# Patient Record
Sex: FEMALE | Race: WHITE
[De-identification: names, ages, dates, MRNs, and addresses within clinical notes are randomized per-mention and may not be internally consistent; named-entity substitution may affect disease eponyms.]

---

## 2018-01-11 ENCOUNTER — HOSPITAL ENCOUNTER (EMERGENCY)
Dept: HOSPITAL 17 - PHED | Age: 53
Discharge: HOME | End: 2018-01-11
Payer: COMMERCIAL

## 2018-01-11 VITALS — HEIGHT: 62 IN | BODY MASS INDEX: 25.15 KG/M2 | WEIGHT: 136.69 LBS

## 2018-01-11 VITALS
HEART RATE: 79 BPM | OXYGEN SATURATION: 98 % | TEMPERATURE: 98.4 F | RESPIRATION RATE: 16 BRPM | SYSTOLIC BLOOD PRESSURE: 128 MMHG | DIASTOLIC BLOOD PRESSURE: 68 MMHG

## 2018-01-11 VITALS — SYSTOLIC BLOOD PRESSURE: 145 MMHG | TEMPERATURE: 98.2 F | DIASTOLIC BLOOD PRESSURE: 55 MMHG

## 2018-01-11 VITALS — OXYGEN SATURATION: 97 % | RESPIRATION RATE: 16 BRPM

## 2018-01-11 DIAGNOSIS — R10.9: Primary | ICD-10-CM

## 2018-01-11 DIAGNOSIS — M54.5: ICD-10-CM

## 2018-01-11 LAB
ALBUMIN SERPL-MCNC: 4 GM/DL (ref 3.4–5)
ALP SERPL-CCNC: 49 U/L (ref 45–117)
ALT SERPL-CCNC: 21 U/L (ref 10–53)
AST SERPL-CCNC: 20 U/L (ref 15–37)
BASOPHILS # BLD AUTO: 0.1 TH/MM3 (ref 0–0.2)
BASOPHILS NFR BLD: 1.7 % (ref 0–2)
BILIRUB SERPL-MCNC: 0.3 MG/DL (ref 0.2–1)
BUN SERPL-MCNC: 11 MG/DL (ref 7–18)
CALCIUM SERPL-MCNC: 9.1 MG/DL (ref 8.5–10.1)
CHLORIDE SERPL-SCNC: 101 MEQ/L (ref 98–107)
COLOR UR: YELLOW
CREAT SERPL-MCNC: 0.75 MG/DL (ref 0.5–1)
EOSINOPHIL # BLD: 0.1 TH/MM3 (ref 0–0.4)
EOSINOPHIL NFR BLD: 2 % (ref 0–4)
ERYTHROCYTE [DISTWIDTH] IN BLOOD BY AUTOMATED COUNT: 11.8 % (ref 11.6–17.2)
GFR SERPLBLD BASED ON 1.73 SQ M-ARVRAT: 81 ML/MIN (ref 89–?)
GLUCOSE SERPL-MCNC: 83 MG/DL (ref 74–106)
GLUCOSE UR STRIP-MCNC: (no result) MG/DL
HCO3 BLD-SCNC: 27.2 MEQ/L (ref 21–32)
HCT VFR BLD CALC: 38.5 % (ref 35–46)
HGB BLD-MCNC: 12.6 GM/DL (ref 11.6–15.3)
HGB UR QL STRIP: (no result)
INR PPP: 1.1 RATIO
KETONES UR STRIP-MCNC: (no result) MG/DL
LIPASE: 164 U/L (ref 73–393)
LYMPHOCYTES # BLD AUTO: 1.8 TH/MM3 (ref 1–4.8)
LYMPHOCYTES NFR BLD AUTO: 23.8 % (ref 9–44)
MCH RBC QN AUTO: 31.3 PG (ref 27–34)
MCHC RBC AUTO-ENTMCNC: 32.8 % (ref 32–36)
MCV RBC AUTO: 95.5 FL (ref 80–100)
MONOCYTE #: 0.7 TH/MM3 (ref 0–0.9)
MONOCYTES NFR BLD: 9.1 % (ref 0–8)
NEUTROPHILS # BLD AUTO: 4.7 TH/MM3 (ref 1.8–7.7)
NEUTROPHILS NFR BLD AUTO: 63.4 % (ref 16–70)
NITRITE UR QL STRIP: (no result)
PLATELET # BLD: 244 TH/MM3 (ref 150–450)
PMV BLD AUTO: 8.9 FL (ref 7–11)
PROT SERPL-MCNC: 7.4 GM/DL (ref 6.4–8.2)
PROTHROMBIN TIME: 10.8 SEC (ref 9.8–11.6)
RBC # BLD AUTO: 4.03 MIL/MM3 (ref 4–5.3)
SODIUM SERPL-SCNC: 136 MEQ/L (ref 136–145)
SP GR UR STRIP: 1.01 (ref 1–1.03)
SQUAMOUS #/AREA URNS HPF: (no result) /HPF (ref 0–5)
URINE LEUKOCYTE ESTERASE: (no result)
WBC # BLD AUTO: 7.4 TH/MM3 (ref 4–11)

## 2018-01-11 PROCEDURE — 85730 THROMBOPLASTIN TIME PARTIAL: CPT

## 2018-01-11 PROCEDURE — 80053 COMPREHEN METABOLIC PANEL: CPT

## 2018-01-11 PROCEDURE — 99285 EMERGENCY DEPT VISIT HI MDM: CPT

## 2018-01-11 PROCEDURE — 83690 ASSAY OF LIPASE: CPT

## 2018-01-11 PROCEDURE — 85610 PROTHROMBIN TIME: CPT

## 2018-01-11 PROCEDURE — 96375 TX/PRO/DX INJ NEW DRUG ADDON: CPT

## 2018-01-11 PROCEDURE — 74177 CT ABD & PELVIS W/CONTRAST: CPT

## 2018-01-11 PROCEDURE — 96374 THER/PROPH/DIAG INJ IV PUSH: CPT

## 2018-01-11 PROCEDURE — 85025 COMPLETE CBC W/AUTO DIFF WBC: CPT

## 2018-01-11 PROCEDURE — 81001 URINALYSIS AUTO W/SCOPE: CPT

## 2018-01-11 NOTE — RADRPT
EXAM DATE/TIME:  2018 18:34 

 

[facility] COMPARISON:     

No previous studies available for comparison.

 

 

INDICATIONS :     

Right lower quadrant pain. 

                      

 

IV CONTRAST:     

85 cc Omnipaque 350 (iohexol) IV 

 

 

ORAL CONTRAST:      

No oral contrast ingested.

                      

 

RADIATION DOSE:     

8.09 CTDIvol (mGy) 

 

 

MEDICAL HISTORY :     

Pancreatitis.  

 

SURGICAL HISTORY :      

Cholecystectomy.  section.Hysterectomy.

 

ENCOUNTER:      

Initial

 

ACUITY:      

1 week

 

PAIN SCALE:      

10/10

 

LOCATION:       

Right lower quadrant 

 

TECHNIQUE:     

Volumetric scanning of the abdomen and pelvis was performed.  Using automated exposure control and ad
justment of the mA and/or kV according to patient size, radiation dose was kept as low as reasonably 
achievable to obtain optimal diagnostic quality images.  DICOM format image data is available electro
nically for review and comparison.  

 

FINDINGS:     

 

LOWER LUNGS:     

The visualized lower lungs are clear.

 

LIVER:     

Homogeneous density without lesion.  There is no dilation of the biliary tree. Previous cholecystecto
my.

 

SPLEEN:     

Normal size without lesion.

 

PANCREAS:     

Within normal limits.

 

KIDNEYS:     

Normal in size and shape.  2.6 cm benign right upper pole cyst. Mild focal cortical thinning/scarring
 of the left lower pole. There is no solid mass, stone or hydronephrosis.

 

ADRENAL GLANDS:     

Within normal limits.

 

VASCULAR:     

There is no aortic aneurysm.

 

BOWEL/MESENTERY:     

The stomach, small bowel, and colon demonstrate no acute abnormality.  There is no free intraperitone
al air or fluid. The appendix well-visualized, normal.

 

ABDOMINAL WALL:     

Within normal limits.

 

RETROPERITONEUM:     

There is no lymphadenopathy. 

 

BLADDER:     

Mild scarring along the anterior margin of the urinary bladder and potentially focally adhesed to the
 adjacent deep margin of the abdominal wall, series 2 image 72.

 

REPRODUCTIVE:     

Hysterectomy changes are noted. No adnexal abnormality.

 

INGUINAL:     

There is no lymphadenopathy or hernia. 

 

MUSCULOSKELETAL:     

No acute bony abnormality demonstrated.

 

CONCLUSION:     

1. No obstruction, inflammatory changes or other acute abnormality. Normal appendix.

2. Previous cholecystectomy and hysterectomy. Mild scarring of the urinary bladder, presumably postsu
rgical.

3. Simple, benign cyst of the right kidney.

 

 

 

 Destin Castro MD on 2018 at 19:04           

Board Certified Radiologist.

 This report was verified electronically.

## 2018-01-11 NOTE — PD
HPI


Chief Complaint:  Musculoskeletal Complaint


Time Seen by Provider:  16:50


Travel History


International Travel<30 days:  No


Contact w/Intl Traveler<30days:  No


Traveled to known affect area:  No





History of Present Illness


HPI


53yo F with PMH of pancreatitis presents to the ED with c/o right lower back 

pain that radiates to right lower abdomen for 1 week.  Said it was intermittent

, sharp.  Associated with nausea and last vomiting was yesterday.  Denies any 

fever, chest pain, sob, dysuria, hematuria, focal weakness or numbness.  Said 

she had pancreatitis before and felt similar.





PFSH


Social History


Tobacco Use:  No





Allergies-Medications


(Allergen,Severity, Reaction):  


Coded Allergies:  


     Penicillins (Verified  Allergy, Unknown, 1/11/18)


Reported Meds & Prescriptions





Reported Meds & Active Scripts


Active


Tylenol (Acetaminophen) 325 Mg Tab 650 Mg PO Q6H PRN


Reported


Vitamin D3 (Cholecalciferol) 5,000 Unit Cap 5,000 Units PO DAILY


Centrum Silver (Multiple Vitamins W/ Minerals) 400 Mcg-250 Mcg Chw 1 Tab PO 

DAILY


Gabapentin 300 Mg Cap 300 Mg PO HS


Tizanidine (Tizanidine HCl) 2 Mg Tab 2 Mg PO HS


Alprazolam 0.5 Mg Tab 0.5 Mg PO Q8H PRN


Citalopram (Citalopram Hydrobromide) 20 Mg Tab 20 Mg PO DAILY


Wellbutrin SR 12 HR (Bupropion HCl) 150 Mg Tab 150 Mg PO Q12HR


Hydrocodone-Acetaminophen  mg Tab 1 Tab PO Q4H PRN








Review of Systems


Except as stated in HPI:  all other systems reviewed are Neg





Physical Exam


Narrative


GENERAL: 53yo F in moderate distress.


SKIN: Focused skin assessment warm/dry.


HEAD: Atraumatic. Normocephalic. 


CARDIOVASCULAR: Regular rate and rhythm.  No murmur appreciated.


RESPIRATORY: No accessory muscle use. Clear to auscultation. Breath sounds 

equal bilaterally. 


GASTROINTESTINAL: Abdomen soft, +TTP RLQ.  +Mild epigastric and suprapubic ttp.

  No rebound tenderness or guarding.


BACK: No midline ttp thoracic or lumbar spine.  +TTP right paraspinal L5.


MUSCULOSKELETAL: No obvious deformities. No clubbing.  No cyanosis.  No edema. 


NEUROLOGICAL: Awake and alert. No obvious cranial nerve deficits.  Motor 

grossly within normal limits. Normal speech.  Sensation intact.


PSYCHIATRIC: Appropriate mood and affect; insight and judgment normal.





Data


Data


Last Documented VS





Vital Signs








  Date Time  Temp Pulse Resp B/P (MAP) Pulse Ox O2 Delivery O2 Flow Rate FiO2


 


1/11/18 20:56 98.2 78 18 145/55 (85) 98   


 


1/11/18 17:50      Room Air  








Orders





 Orders


Complete Blood Count With Diff (1/11/18 17:02)


Comprehensive Metabolic Panel (1/11/18 17:02)


Lipase (1/11/18 17:02)


Prothrombin Time / Inr (Pt) (1/11/18 17:02)


Act Partial Throm Time (Ptt) (1/11/18 17:02)


Urinalysis - C+S If Indicated (1/11/18 17:02)


Ct Abd/Pel W Iv Contrast(Rout) (1/11/18 17:02)


Iv Access Insert/Monitor (1/11/18 17:02)


Ecg Monitoring (1/11/18 17:02)


Oximetry (1/11/18 17:02)


Sodium Chloride 0.9% Flush (Ns Flush) (1/11/18 17:15)


Iohexol 350 Inj (Omnipaque 350 Inj) (1/11/18 18:44)


Ketorolac Inj (Toradol Inj) (1/11/18 19:15)


Ondansetron Inj (Zofran Inj) (1/11/18 20:00)


Morphine Inj (Morphine Inj) (1/11/18 20:00)





Labs





Laboratory Tests








Test


  1/11/18


17:20 1/11/18


17:50


 


Urine Color YELLOW  


 


Urine Turbidity CLEAR  


 


Urine pH 6.0  


 


Urine Specific Gravity 1.015  


 


Urine Protein NEG mg/dL  


 


Urine Glucose (UA) NEG mg/dL  


 


Urine Ketones TRACE mg/dL  


 


Urine Occult Blood SMALL  


 


Urine Nitrite NEG  


 


Urine Bilirubin NEG  


 


Urine Leukocyte Esterase NEG  


 


Urine Squamous Epithelial


Cells 0-5 /hpf 


  


 


 


Microscopic Urinalysis Comment


  CULT NOT


INDICATED 


 


 


White Blood Count  7.4 TH/MM3 


 


Red Blood Count  4.03 MIL/MM3 


 


Hemoglobin  12.6 GM/DL 


 


Hematocrit  38.5 % 


 


Mean Corpuscular Volume  95.5 FL 


 


Mean Corpuscular Hemoglobin  31.3 PG 


 


Mean Corpuscular Hemoglobin


Concent 


  32.8 % 


 


 


Red Cell Distribution Width  11.8 % 


 


Platelet Count  244 TH/MM3 


 


Mean Platelet Volume  8.9 FL 


 


Neutrophils (%) (Auto)  63.4 % 


 


Lymphocytes (%) (Auto)  23.8 % 


 


Monocytes (%) (Auto)  9.1 % 


 


Eosinophils (%) (Auto)  2.0 % 


 


Basophils (%) (Auto)  1.7 % 


 


Neutrophils # (Auto)  4.7 TH/MM3 


 


Lymphocytes # (Auto)  1.8 TH/MM3 


 


Monocytes # (Auto)  0.7 TH/MM3 


 


Eosinophils # (Auto)  0.1 TH/MM3 


 


Basophils # (Auto)  0.1 TH/MM3 


 


CBC Comment  DIFF FINAL 


 


Differential Comment   


 


Prothrombin Time  10.8 SEC 


 


Prothromb Time International


Ratio 


  1.1 RATIO 


 


 


Activated Partial


Thromboplast Time 


  26.6 SEC 


 


 


Blood Urea Nitrogen  11 MG/DL 


 


Creatinine  0.75 MG/DL 


 


Random Glucose  83 MG/DL 


 


Total Protein  7.4 GM/DL 


 


Albumin  4.0 GM/DL 


 


Calcium Level  9.1 MG/DL 


 


Alkaline Phosphatase  49 U/L 


 


Aspartate Amino Transf


(AST/SGOT) 


  20 U/L 


 


 


Alanine Aminotransferase


(ALT/SGPT) 


  21 U/L 


 


 


Total Bilirubin  0.3 MG/DL 


 


Sodium Level  136 MEQ/L 


 


Potassium Level  4.0 MEQ/L 


 


Chloride Level  101 MEQ/L 


 


Carbon Dioxide Level  27.2 MEQ/L 


 


Anion Gap  8 MEQ/L 


 


Estimat Glomerular Filtration


Rate 


  81 ML/MIN 


 


 


Lipase  164 U/L 











[facility]


Medical Decision Making


Medical Screen Exam Complete:  Yes


Emergency Medical Condition:  Yes


Differential Diagnosis


Nephrolithiasis vs. appendicitis vs. colitis vs. pancreatitis


Narrative Course


53yo F with right lower back pain radiating to right lower abdomen for 1 week.  

Labs reviewed, no leukocytosis.  H/H normal.  CMP unremarkable.  Lipase normal.

  UA showed trace ketone.  Culture not indicated.  Pt given toradol but still 

with pain.  Will give morphine.  CT a/p showed no obstruction, inflammatory 

changes, or other acute abnormality.   Normal appendix.  Pt reevaluated after 

morphine and zofran and pain and nausea had resolved.  Return precautions given.





Diagnosis





 Primary Impression:  


 Abdominal pain


 Qualified Codes:  R10.9 - Unspecified abdominal pain


Patient Instructions:  General Instructions


Departure Forms:  Tests/Procedures





***Additional Instructions:  


Please follow up with your primary care physician in 2-3 days.  Return to the 

ED if symptoms worsen.


***Med/Other Pt SpecificInfo:  Prescription(s) given


Scripts


Acetaminophen (Tylenol) 325 Mg Tab


650 MG PO Q6H Y for PAIN SCALE 1 TO 4, #20 TAB 0 Refills


   Prov: Malina Baltazar DO         1/11/18


Disposition:  01 DISCHARGE HOME


Condition:  Stable











Malina Baltazar DO Jan 11, 2018 17:24